# Patient Record
Sex: FEMALE | Race: WHITE | NOT HISPANIC OR LATINO | Employment: OTHER | URBAN - METROPOLITAN AREA
[De-identification: names, ages, dates, MRNs, and addresses within clinical notes are randomized per-mention and may not be internally consistent; named-entity substitution may affect disease eponyms.]

---

## 2017-02-20 DIAGNOSIS — J22 ACUTE LOWER RESPIRATORY INFECTION: ICD-10-CM

## 2017-02-20 DIAGNOSIS — Z87.09 HISTORY OF COPD: ICD-10-CM

## 2017-02-21 RX ORDER — ALBUTEROL SULFATE 90 UG/1
2 AEROSOL, METERED RESPIRATORY (INHALATION) EVERY 6 HOURS PRN
Qty: 8.5 G | Refills: 3 | Status: SHIPPED | OUTPATIENT
Start: 2017-02-21 | End: 2017-11-28

## 2017-02-27 ENCOUNTER — TELEPHONE (OUTPATIENT)
Dept: MEDICAL GROUP | Facility: PHYSICIAN GROUP | Age: 77
End: 2017-02-27

## 2017-02-27 NOTE — TELEPHONE ENCOUNTER
Was the patient seen in the last year in this department? Yes     Does patient have an active prescription for medications requested? No     Received Request Via: Patient     Pt is requsting Ventolin, which I don't see in her chart at all

## 2017-03-11 ENCOUNTER — OFFICE VISIT (OUTPATIENT)
Dept: URGENT CARE | Facility: PHYSICIAN GROUP | Age: 77
End: 2017-03-11
Payer: MEDICARE

## 2017-03-11 VITALS
TEMPERATURE: 98 F | DIASTOLIC BLOOD PRESSURE: 58 MMHG | WEIGHT: 150 LBS | HEART RATE: 60 BPM | OXYGEN SATURATION: 93 % | BODY MASS INDEX: 26.58 KG/M2 | SYSTOLIC BLOOD PRESSURE: 124 MMHG | HEIGHT: 63 IN

## 2017-03-11 DIAGNOSIS — M19.90 OSTEOARTHRITIS, UNSPECIFIED OSTEOARTHRITIS TYPE, UNSPECIFIED SITE: ICD-10-CM

## 2017-03-11 PROCEDURE — 99213 OFFICE O/P EST LOW 20 MIN: CPT | Performed by: NURSE PRACTITIONER

## 2017-03-11 ASSESSMENT — ENCOUNTER SYMPTOMS
JOINT SWELLING: 1
FEVER: 0

## 2017-03-11 NOTE — Clinical Note
March 11, 2017       Patient: Tacho Machado   YOB: 1940   Date of Visit: 3/11/2017         To Whom It May Concern:    It is my medical opinion that Tacho Machado remain out of work until 03/14/17 when she can return to work without restrictions.               Sincerely,          TALHA Garcia  Electronically Signed

## 2017-03-11 NOTE — PROGRESS NOTES
"Subjective:      Tacho Machado is a 76 y.o. female who presents with Knee Pain    Chief Complaint   Patient presents with   • Knee Pain     both knee pain x5days          PFSH reviewed in EPIC electronic record today with patient and are not significant to today's problem         No history of VTE  Medications including OTC medications reviewed with patient.         No Known Allergies      Knee Pain  This is a new problem. Episode onset: 5 days. The problem has been gradually worsening. Associated symptoms include joint swelling. Pertinent negatives include no fever.       Review of Systems   Constitutional: Negative for fever.   Cardiovascular: Negative for leg swelling.   Musculoskeletal: Positive for joint swelling.          Objective:     /58 mmHg  Pulse 60  Temp(Src) 36.7 °C (98 °F)  Ht 1.588 m (5' 2.52\")  Wt 68.04 kg (150 lb)  BMI 26.98 kg/m2  SpO2 93%     Physical Exam   Constitutional: She appears well-developed and well-nourished.   Cardiovascular: Normal rate, regular rhythm and normal pulses.    + 1 lower extremity edema bilaterally.      Musculoskeletal:        Right knee: She exhibits normal range of motion, no swelling, no effusion, no ecchymosis, no deformity, no laceration, no erythema, normal alignment, no LCL laxity, normal patellar mobility, no bony tenderness, normal meniscus and no MCL laxity. Tenderness found. MCL tenderness noted. No medial joint line, no lateral joint line and no patellar tendon tenderness noted.        Legs:  Skin: Skin is warm and intact.   Nursing note and vitals reviewed.              Assessment/Plan:       1. Osteoarthritis, unspecified osteoarthritis type, unspecified site       ACE wrap prn discomfort  Consider cane for support and pain relief  OTC analgesic rubs prn Dosage and directions per   Ice or heat packs prn discomfort  Return to clinic or PCP 3-4 days if current symptoms are not resolving in a satisfactory manner or sooner if new or " worsening symptoms occur.   Patient and or family advised differential diagnoses, signs and symptoms which would warrant Emergency Department evaluation.  Verbalizes understanding of instructions.

## 2017-03-23 ENCOUNTER — PATIENT OUTREACH (OUTPATIENT)
Dept: HEALTH INFORMATION MANAGEMENT | Facility: OTHER | Age: 77
End: 2017-03-23

## 2017-04-04 ENCOUNTER — TELEPHONE (OUTPATIENT)
Dept: HEALTH INFORMATION MANAGEMENT | Facility: OTHER | Age: 77
End: 2017-04-04

## 2017-04-04 NOTE — PROGRESS NOTES
Attempt #:3    Verify PCP: yes    Communication Preference Obtained: yes     Review Care Team: yes    Annual Wellness Visit Scheduling  1. Scheduling Status:Scheduled          Care Gap Scheduling (Attempt to Schedule EACH Overdue Care Gap!)     Health Maintenance Due   Topic Date Due   • Annual Wellness Visit  SCHEDULED   • IMM DTaP/Tdap/Td Vaccine (1 - Tdap) SCHEDULED    • BONE DENSITY  PT SAID SHE NO LONGER GETS         MyChart Activation: sent activation code  ObjectVideot Jaden: no  Virtual Visits: no  Opt In to Text Messages: no

## 2017-04-04 NOTE — TELEPHONE ENCOUNTER
Patient is over the age of 75 and showing overdue for Bone Density. Patient stated she no longer gets this exam.    Please reply to this message as to whether these tests are appropriate and I will update the Health Maintenance Topic or contact the patient to schedule.

## 2017-04-26 ENCOUNTER — TELEPHONE (OUTPATIENT)
Dept: MEDICAL GROUP | Facility: PHYSICIAN GROUP | Age: 77
End: 2017-04-26

## 2017-05-10 NOTE — TELEPHONE ENCOUNTER
ANNUAL WELLNESS VISIT PRE-VISIT PLANNING     1.  Reviewed last PCP office visit assessment and plan notes: Yes    2.  If any orders were placed last visit do we have Results/Consult Notes?        •  Labs? Yes 10/13/2016 COMPLETE       •  Imaging? No        •  Referrals? No     3.  Patient Care Coordination Note was updated with diagnosis information:  No    4.  Patient is due for these Health Maintenance Topics:   Health Maintenance Due   Topic Date Due   • Annual Wellness Visit  1940   • IMM DTaP/Tdap/Td Vaccine (1 - Tdap) 04/24/1959          •  JATIN letter was faxed to:  NONE    5.  Immunizations were updated in [employer] using WebIZ?: No       •  Web Iz Recommendations:  TDAP       •  Is patient due for Tdap/Shingles? Yes.  If yes, was patient alerted of copay? No    6.  Patient has:       •   Diabetes: NO       •   COPD: NO       •   CHF: NO       •   Depression: NO    7.  Updated Care Team with [employer] and all specialists? UNABLE TO REACH       •   Gait devices, O2, CPAP, etc: N\A        •   Eye professional: N\A       •   Other specialists (GYN, cardiology, endo, etc): N\A    8.  Is patient in need of any refills prior to office visit? No       •    Separate refill encounter created?:  N\A    9.  Patient was informed to arrive 15 min prior to their scheduled appointment and bring in their medication bottles? no    10.  Patient was advised: “This is a free wellness visit. The provider will screen for medical conditions to help you stay healthy. If you have other concerns to address you may be asked to discuss these at a separate visit or there may be an additional fee.”  No UNABLE TO REACH

## 2017-05-12 ENCOUNTER — OFFICE VISIT (OUTPATIENT)
Dept: MEDICAL GROUP | Facility: PHYSICIAN GROUP | Age: 77
End: 2017-05-12
Payer: MEDICARE

## 2017-05-12 VITALS
HEIGHT: 63 IN | TEMPERATURE: 97.9 F | OXYGEN SATURATION: 91 % | WEIGHT: 152 LBS | HEART RATE: 65 BPM | DIASTOLIC BLOOD PRESSURE: 68 MMHG | BODY MASS INDEX: 26.93 KG/M2 | SYSTOLIC BLOOD PRESSURE: 154 MMHG

## 2017-05-12 DIAGNOSIS — Z23 NEED FOR DIPHTHERIA-TETANUS-PERTUSSIS (TDAP) VACCINE, ADULT/ADOLESCENT: ICD-10-CM

## 2017-05-12 DIAGNOSIS — M15.9 PRIMARY OSTEOARTHRITIS INVOLVING MULTIPLE JOINTS: ICD-10-CM

## 2017-05-12 DIAGNOSIS — Z00.00 MEDICARE ANNUAL WELLNESS VISIT, SUBSEQUENT: ICD-10-CM

## 2017-05-12 DIAGNOSIS — C14.0 THROAT CANCER (HCC): ICD-10-CM

## 2017-05-12 DIAGNOSIS — F41.9 ANXIETY: ICD-10-CM

## 2017-05-12 PROCEDURE — G8432 DEP SCR NOT DOC, RNG: HCPCS | Performed by: FAMILY MEDICINE

## 2017-05-12 PROCEDURE — 90471 IMMUNIZATION ADMIN: CPT | Performed by: FAMILY MEDICINE

## 2017-05-12 PROCEDURE — 1036F TOBACCO NON-USER: CPT | Performed by: FAMILY MEDICINE

## 2017-05-12 PROCEDURE — G0438 PPPS, INITIAL VISIT: HCPCS | Mod: 25 | Performed by: FAMILY MEDICINE

## 2017-05-12 PROCEDURE — 90715 TDAP VACCINE 7 YRS/> IM: CPT | Performed by: FAMILY MEDICINE

## 2017-05-12 RX ORDER — CITALOPRAM 20 MG/1
20 TABLET ORAL DAILY
Qty: 90 TAB | Refills: 3 | Status: SHIPPED | OUTPATIENT
Start: 2017-05-12 | End: 2018-08-07

## 2017-05-12 ASSESSMENT — PATIENT HEALTH QUESTIONNAIRE - PHQ9: CLINICAL INTERPRETATION OF PHQ2 SCORE: 0

## 2017-05-12 NOTE — MR AVS SNAPSHOT
"Tacho Machado   2017 4:00 PM   Office Visit   MRN: [id number]    Department:  [facility]   Dept Phone:  [phone]    Description:  Female : 1940   Provider:  Ely Decker D.O.; [facility] [place]            Reason for Visit     Annual Exam           Allergies as of 2017     No Known Allergies      You were diagnosed with     Medicare annual wellness visit, subsequent   [726551]       Throat cancer (CMS-HCC)   [637744]       Primary osteoarthritis involving multiple joints   [5449134]       Anxiety   [332876]       Need for diphtheria-tetanus-pertussis (Tdap) vaccine, adult/adolescent   [722403]         Vital Signs     Blood Pressure Pulse Temperature Height Weight Body Mass Index    154/68 mmHg 65 36.6 °C (97.9 °F) 1.588 m (5' 2.5\") 68.947 kg (152 lb) 27.34 kg/m2    Oxygen Saturation Smoking Status                91% Former Smoker          Basic Information     Date Of Birth Sex Race Ethnicity Preferred Language    1940 Female White Non- English      Your appointments     2017  2:20 PM   Established Patient with Ely Decker D.O.   [facility]    [address] [place] [id number] [phone]           You will be receiving a confirmation call a few days before your appointment from our automated call confirmation system.              Problem List              ICD-10-CM Priority Class Noted - Resolved    Anxiety F41.9   10/7/2016 - Present    Throat cancer (CMS-HCC) C14.0   10/7/2016 - Present    Primary osteoarthritis involving multiple joints M15.0   10/7/2016 - Present      Health Maintenance        Date Due Completion Dates    IMM DTaP/Tdap/Td Vaccine (1 - Tdap) 1959 ---    IMM PNEUMOCOCCAL 65+ (ADULT) LOW/MEDIUM RISK SERIES (2 of 2 - PPSV23) 10/7/2017 10/7/2016            Current Immunizations     13-VALENT PCV PREVNAR 10/7/2016    Influenza Vaccine Adult HD 10/7/2016    SHINGLES VACCINE 2015    Tdap Vaccine "  Incomplete      Below and/or attached are the medications your provider expects you to take. Review all of your home medications and newly ordered medications with your provider and/or pharmacist. Follow medication instructions as directed by your provider and/or pharmacist. Please keep your medication list with you and share with your provider. Update the information when medications are discontinued, doses are changed, or new medications (including over-the-counter products) are added; and carry medication information at all times in the event of emergency situations     Allergies:  No Known Allergies          Medications  Valid as of: May 12, 2017 -  4:40 PM    Generic Name Brand Name Tablet Size Instructions for use    Albuterol Sulfate (Aero Soln) albuterol 108 (90 BASE) MCG/ACT Inhale 2 Puffs by mouth every 6 hours as needed for Shortness of Breath.        Benzonatate (Cap) TESSALON 100 MG Take 2 Caps by mouth 3 times a day as needed for Cough.        Citalopram Hydrobromide (Tab) CELEXA 20 MG Take 1 Tab by mouth every day.        Ipratropium-Albuterol (Aero Soln) COMBIVENT RESPIMAT  MCG/ACT Inhale 1 Puff by mouth 4 times a day.        .                 Medicines prescribed today were sent to:     [facility] [address] [place] [address] (S), [place] - [address] [id number] [facility] [place] (S) [place] [address]    Phone: [phone] Fax: [phone]    Open 24 Hours?: No      Medication refill instructions:       If your prescription bottle indicates you have medication refills left, it is not necessary to call your provider’s office. Please contact your pharmacy and they will refill your medication.    If your prescription bottle indicates you do not have any refills left, you may request refills at any time through one of the following ways: The online [employer] system (except Urgent Care), by calling your provider’s office, or by asking your pharmacy to contact your provider’s office with a refill request. Medication  refills are processed only during regular business hours and may not be available until the next business day. Your provider may request additional information or to have a follow-up visit with you prior to refilling your medication.   *Please Note: Medication refills are assigned a new Rx number when refilled electronically. Your pharmacy may indicate that no refills were authorized even though a new prescription for the same medication is available at the pharmacy. Please request the medicine by name with the pharmacy before contacting your provider for a refill.        Your To Do List     Future Labs/Procedures Complete By Expires    COMP METABOLIC PANEL  As directed 5/13/2018    LIPID PROFILE  As directed 5/13/2018    VITAMIN D,25 HYDROXY  As directed 5/13/2018         [employer] Access Code: [email or web address]  Expires: 6/11/2017  4:40 PM    [employer]  A secure, online tool to manage your health information     [employer]® is a secure, online tool that connects you to your personalized health information from the privacy of your home -- day or night - making it very easy for you to manage your healthcare. Once the activation process is completed, you can even access your medical information using the [employer] jaden, which is available for free in the Apple Jaden store or Google Play store.     [employer] provides the following levels of access (as shown below):   My Chart Features   [facility]  Specialists [facility]  Urgent  Care [facility]  Primary Care  Doctor   Email your healthcare team securely and privately 24/7 X X X    Manage appointments: schedule your next appointment; view details of past/upcoming appointments X      Request prescription refills. X      View recent personal medical records, including lab and immunizations X X X X   View health record, including health history, allergies, medications X X X X   Read reports about your outpatient visits, procedures, consult and ER  notes X X X X   See your discharge summary, which is a recap of your hospital and/or ER visit that includes your diagnosis, lab results, and care plan. X X       How to register for [employer]:  1. Go to  [email or web address].  2. Click on the Sign Up Now box, which takes you to the New Member Sign Up page. You will need to provide the following information:  a. Enter your [employer] Access Code exactly as it appears at the top of this page. (You will not need to use this code after you’ve completed the sign-up process. If you do not sign up before the expiration date, you must request a new code.)   b. Enter your date of birth.   c. Enter your home email address.   d. Click Submit, and follow the next screen’s instructions.  3. Create a [employer] ID. This will be your [employer] login ID and cannot be changed, so think of one that is secure and easy to remember.  4. Create a [employer] password. You can change your password at any time.  5. Enter your Password Reset Question and Answer. This can be used at a later time if you forget your password.   6. Enter your e-mail address. This allows you to receive e-mail notifications when new information is available in [employer].  7. Click Sign Up. You can now view your health information.    For assistance activating your [employer] account, call [phone]

## 2017-05-12 NOTE — PROGRESS NOTES
Chief Complaint   Patient presents with   • Annual Exam         HPI:  Tacho Machado is a 77 y.o. female here for Medicare Annual Wellness Visit        Patient Active Problem List    Diagnosis Date Noted   • Anxiety 10/07/2016   • Throat cancer (CMS-HCC) 10/07/2016   • Primary osteoarthritis involving multiple joints 10/07/2016       Current Outpatient Prescriptions   Medication Sig Dispense Refill   • citalopram (CELEXA) 20 MG Tab Take 1 Tab by mouth every day. 90 Tab 3   • albuterol 108 (90 BASE) MCG/ACT Aero Soln inhalation aerosol Inhale 2 Puffs by mouth every 6 hours as needed for Shortness of Breath. 8.5 g 3   • benzonatate (TESSALON) 100 MG Cap Take 2 Caps by mouth 3 times a day as needed for Cough. 30 Cap 0   • ipratropium-albuterol (COMBIVENT RESPIMAT)  MCG/ACT Aero Soln Inhale 1 Puff by mouth 4 times a day.       No current facility-administered medications for this visit.        Patient is taking medications as noted in medication list.  Current supplements as per medication list.   Chronic narcotic pain medicines: no    Allergies: Review of patient's allergies indicates no known allergies.    Current social contact/activities: N/A     Is patient current with immunizations?  Need TDAP    She  reports that she quit smoking about 16 years ago. She has never used smokeless tobacco. She reports that she does not drink alcohol or use illicit drugs.  Counseling given: Not Answered        DPA/Advanced Directive:  Patient does not have an Advanced Directive.  A packet and workshop information was given on Advanced Directives.    ROS:    Gait: Uses no assistive device   Ostomy: no   Other tubes: no   Amputations: no   Chronic oxygen use: no   Last eye exam: 05/01/2016   Wears hearing aids: no   : Denies incontinence.       Screening: mammogram, DEXA, colonoscopy  Depression Screening    Little interest or pleasure in doing things?  0 - not at all  Feeling down, depressed, or hopeless?  0 - not at  all  Patient Health Questionnaire Score: 0  If depressive symptoms identified deferred to follow up visit unless specifically addressed in assessment and plan.    Interpretation of PHQ-9 Total Score   Score Severity   1-4 Minimal Depression   5-9 Mild Depression   10-14 Moderate Depression   15-19 Moderately Severe Depression   20-27 Severe Depression    Screening for Cognitive Impairment    Three Minute Recall (banana, sunrise, fence)  1/3 banana  Draw clock face with all 12 numbers set to the hand to show 10 minutes past 11 o'clock  0    If cognitive concerns identified deferred to follow up visit unless specifically addressed in assessment and plan.    Fall Risk Assessment    Has the patient had two or more falls in the last year or any fall with injury in the last year?  No  If Fall Risk identified deferred to follow up visit unless specifically addressed in assessment and plan.    Safety Assessment    Throw rugs on floor.  Yes  Handrails on all stairs.  No  Good lighting in all hallways.  Yes  Difficulty hearing.  No  Patient counseled about all safety risks that were identified.    Functional Assessment ADLs    Are there any barriers preventing you from cooking for yourself or meeting nutritional needs?  No.    Are there any barriers preventing you from driving safely or obtaining transportation?  No.    Are there any barriers preventing you from using a telephone or calling for help?  No.    Are there any barriers preventing you from shopping?  No.    Are there any barriers preventing you from taking care of your own finances?  No.    Are there any barriers preventing you from managing your medications?  No.    Are currently engaging any exercise or physical activity?  No.       Health Maintenance Summary                Annual Wellness Visit Overdue 1940     IMM DTaP/Tdap/Td Vaccine Overdue 4/24/1959     IMM PNEUMOCOCCAL 65+ (ADULT) LOW/MEDIUM RISK SERIES Next Due 10/7/2017      Done 10/7/2016 Imm Admin:  "Pneumococcal Conjugate Vaccine (Prevnar/PCV-13)          Patient Care Team:  Ely Decker D.O. as PCP - General (Family Medicine)    Social History   Substance Use Topics   • Smoking status: Former Smoker     Quit date: 05/09/2001   • Smokeless tobacco: Never Used   • Alcohol Use: No     Family History   Problem Relation Age of Onset   • Lung Disease Mother      emphysema   • Stroke Father    • Diabetes Father    • Cancer Sister      She  has a past medical history of COPD (chronic obstructive pulmonary disease) (CMS-HCC); Allergy; Anxiety; and Throat cancer (CMS-HCC).   Past Surgical History   Procedure Laterality Date   • Other       voice box   • Abdominal hysterectomy total     • Breast implant revision     • Vocal cord polyp excision       cancer   • Cataract extraction with iol     • Sinus washing             Exam:     Blood pressure 154/68, pulse 65, temperature 36.6 °C (97.9 °F), height 1.588 m (5' 2.5\"), weight 68.947 kg (152 lb), SpO2 91 %. Body mass index is 27.34 kg/(m^2).    Hearing good.    Dentition good  Alert, oriented in no acute distress.  Eye contact is good, speech goal directed, affect calm      Assessment and Plan. The following treatment and monitoring plan is recommended:    1. Medicare annual wellness visit, subsequent  COMP METABOLIC PANEL    LIPID PROFILE    VITAMIN D,25 HYDROXY    Chart updated.   2. Throat cancer (CMS-HCC)      Stable. Monitor   3. Primary osteoarthritis involving multiple joints      Stable. Monitor   4. Anxiety      Stable. Continue current medications; refills provided. Monitor   5. Need for diphtheria-tetanus-pertussis (Tdap) vaccine, adult/adolescent  TDAP VACCINE =>6YO IM    Age appropriate immunization provided; patient tolerated procedure well.         Services suggested: No services needed at this time  Health Care Screening recommendations as per orders if indicated.  Referrals offered: PT/OT/Nutrition counseling/Behavioral Health/Smoking cessation as " per orders if indicated.    Discussion today about general wellness and lifestyle habits:    · Prevent falls and reduce trip hazards; Cautioned about securing or removing rugs.  · Have a working fire alarm and carbon monoxide detector;   · Engage in regular physical activity and social activities       Follow-up: Return in about 6 months (around 11/12/2017) for labs/medication review, Short.

## 2017-09-23 ENCOUNTER — HOSPITAL ENCOUNTER (EMERGENCY)
Facility: MEDICAL CENTER | Age: 77
End: 2017-09-23
Attending: EMERGENCY MEDICINE
Payer: MEDICARE

## 2017-09-23 VITALS
HEART RATE: 64 BPM | SYSTOLIC BLOOD PRESSURE: 158 MMHG | BODY MASS INDEX: 28.2 KG/M2 | OXYGEN SATURATION: 91 % | WEIGHT: 153.22 LBS | DIASTOLIC BLOOD PRESSURE: 73 MMHG | RESPIRATION RATE: 18 BRPM | HEIGHT: 62 IN | TEMPERATURE: 98.1 F

## 2017-09-23 DIAGNOSIS — R51.9 ACUTE NONINTRACTABLE HEADACHE, UNSPECIFIED HEADACHE TYPE: ICD-10-CM

## 2017-09-23 DIAGNOSIS — J02.9 PHARYNGITIS, UNSPECIFIED ETIOLOGY: ICD-10-CM

## 2017-09-23 PROCEDURE — A9270 NON-COVERED ITEM OR SERVICE: HCPCS | Performed by: EMERGENCY MEDICINE

## 2017-09-23 PROCEDURE — 700102 HCHG RX REV CODE 250 W/ 637 OVERRIDE(OP): Performed by: EMERGENCY MEDICINE

## 2017-09-23 PROCEDURE — 99283 EMERGENCY DEPT VISIT LOW MDM: CPT

## 2017-09-23 RX ORDER — ACETAMINOPHEN 325 MG/1
650 TABLET ORAL ONCE
Status: COMPLETED | OUTPATIENT
Start: 2017-09-23 | End: 2017-09-23

## 2017-09-23 RX ADMIN — ACETAMINOPHEN 650 MG: 325 TABLET, FILM COATED ORAL at 21:08

## 2017-09-24 ENCOUNTER — PATIENT OUTREACH (OUTPATIENT)
Dept: HEALTH INFORMATION MANAGEMENT | Facility: OTHER | Age: 77
End: 2017-09-24

## 2017-09-24 NOTE — PROGRESS NOTES
Placed discharge outreach phone call to patient s/p ER discharge 9/23/17.  Left voicemail providing my contact information and instructions to call with any questions or concerns.

## 2017-09-24 NOTE — DISCHARGE INSTRUCTIONS
"Antibiotic Nonuse   Your caregiver felt that the infection or problem was not one that would be helped with an antibiotic.  Infections may be caused by viruses or bacteria. Only a caregiver can tell which one of these is the likely cause of an illness. A cold is the most common cause of infection in both adults and children. A cold is a virus. Antibiotic treatment will have no effect on a viral infection. Viruses can lead to many lost days of work caring for sick children and many missed days of school. Children may catch as many as 10 \"colds\" or \"flus\" per year during which they can be tearful, cranky, and uncomfortable. The goal of treating a virus is aimed at keeping the ill person comfortable.  Antibiotics are medications used to help the body fight bacterial infections. There are relatively few types of bacteria that cause infections but there are hundreds of viruses. While both viruses and bacteria cause infection they are very different types of germs. A viral infection will typically go away by itself within 7 to 10 days. Bacterial infections may spread or get worse without antibiotic treatment.  Examples of bacterial infections are:  · Sore throats (like strep throat or tonsillitis).  · Infection in the lung (pneumonia).  · Ear and skin infections.  Examples of viral infections are:  · Colds or flus.  · Most coughs and bronchitis.  · Sore throats not caused by Strep.  · Runny noses.  It is often best not to take an antibiotic when a viral infection is the cause of the problem. Antibiotics can kill off the helpful bacteria that we have inside our body and allow harmful bacteria to start growing. Antibiotics can cause side effects such as allergies, nausea, and diarrhea without helping to improve the symptoms of the viral infection. Additionally, repeated uses of antibiotics can cause bacteria inside of our body to become resistant. That resistance can be passed onto harmful bacterial. The next time you have " an infection it may be harder to treat if antibiotics are used when they are not needed. Not treating with antibiotics allows our own immune system to develop and take care of infections more efficiently. Also, antibiotics will work better for us when they are prescribed for bacterial infections.  Treatments for a child that is ill may include:  · Give extra fluids throughout the day to stay hydrated.  · Get plenty of rest.  · Only give your child over-the-counter or prescription medicines for pain, discomfort, or fever as directed by your caregiver.  · The use of a cool mist humidifier may help stuffy noses.  · Cold medications if suggested by your caregiver.  Your caregiver may decide to start you on an antibiotic if:  · The problem you were seen for today continues for a longer length of time than expected.  · You develop a secondary bacterial infection.  SEEK MEDICAL CARE IF:  · Fever lasts longer than 5 days.  · Symptoms continue to get worse after 5 to 7 days or become severe.  · Difficulty in breathing develops.  · Signs of dehydration develop (poor drinking, rare urinating, dark colored urine).  · Changes in behavior or worsening tiredness (listlessness or lethargy).  Document Released: 02/26/2003 Document Revised: 03/11/2013 Document Reviewed: 08/25/2010  [employer]® Patient Information ©2014 [employer].      Pharyngitis  Pharyngitis is a sore throat (pharynx). There is redness, pain, and swelling of your throat.  HOME CARE   · Drink enough fluids to keep your pee (urine) clear or pale yellow.  · Only take medicine as told by your doctor.  ¨ You may get sick again if you do not take medicine as told. Finish your medicines, even if you start to feel better.  ¨ Do not take aspirin.  · Rest.  · Rinse your mouth (gargle) with salt water (½ tsp of salt per 1 qt of water) every 1-2 hours. This will help the pain.  · If you are not at risk for choking, you can suck on hard candy or sore throat lozenges.  GET HELP  IF:  · You have large, tender lumps on your neck.  · You have a rash.  · You cough up green, yellow-brown, or bloody spit.  GET HELP RIGHT AWAY IF:   · You have a stiff neck.  · You drool or cannot swallow liquids.  · You throw up (vomit) or are not able to keep medicine or liquids down.  · You have very bad pain that does not go away with medicine.  · You have problems breathing (not from a stuffy nose).  MAKE SURE YOU:   · Understand these instructions.  · Will watch your condition.  · Will get help right away if you are not doing well or get worse.     This information is not intended to replace advice given to you by your health care provider. Make sure you discuss any questions you have with your health care provider.     Document Released: 06/05/2009 Document Revised: 10/08/2014 Document Reviewed: 08/25/2014  [employer] Patient Education ©2016 [employer]

## 2017-09-24 NOTE — ED NOTES
Pt ambulatory to room 61. Pt c/o sore throat, painful swallowing and bilat ear pain x 3 days. Hoarse voice noted/ pt whispering. Hx of throat cancer 10 years ago. Pt swallowing secretions without difficulty.

## 2017-09-24 NOTE — ED PROVIDER NOTES
"ED Provider Note    CHIEF COMPLAINT  Chief Complaint   Patient presents with   • Sore Throat   • Headache       HPI  Tacho Machado is a 77 y.o. female who presents With a chief complaint of sore throat and referred headache from the ears over the last 3 days without fever, cough, nasal congestion. 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 She does have his history of COPD but is not labored in her breathing today and denies any other complaints    ROS  Pertinent negative for fever.    PAST MEDICAL HISTORY  Past Medical History:   Diagnosis Date   • Allergy    • Anxiety    • COPD (chronic obstructive pulmonary disease) (CMS-[facility])    • Throat cancer (CMS-HCC)        FAMILY HISTORY  Family History   Problem Relation Age of Onset   • Lung Disease Mother      emphysema   • Stroke Father    • Diabetes Father    • Cancer Sister        SOCIAL HISTORY   reports that she quit smoking about 16 years ago. She has never used smokeless tobacco. She reports that she does not drink alcohol or use drugs.    SURGICAL HISTORY  Past Surgical History:   Procedure Laterality Date   • ABDOMINAL HYSTERECTOMY TOTAL     • BREAST IMPLANT REVISION     • CATARACT EXTRACTION WITH IOL     • OTHER      voice box   • SINUS WASHING     • VOCAL CORD POLYP EXCISION      cancer       CURRENT MEDICATIONS  Home Medications    **Home medications have not yet been reviewed for this encounter**         ALLERGIES  No Known Allergies    PHYSICAL EXAM  VITAL SIGNS: /89   Pulse 69   Temp 37 °C (98.6 °F) (Temporal)   Resp 16   Ht 1.575 m (5' 2\")   Wt 69.5 kg (153 lb 3.5 oz)   SpO2 91%   BMI 28.02 kg/m²    Constitutional: Well developed, Well nourished, No acute distress, Non-toxic appearance.   HENT: Mild erythema of the throat, no uvular deviation, no pustular exudate  Eyes: Normal  Neck: Minimal tenderness in the left mandibular region  Lymphatic: No lymphadenopathy  Cardiovascular: Regular rate " and rhythm without murmur  Thorax & Lungs: Clear to auscultation without wheezes  Skin: No cyanosis  Neurologic: Neurologically intact  Psychiatric: Normal affect      COURSE & MEDICAL DECISION MAKING  Pertinent Labs & Imaging studies reviewed. (See chart for details)  I have a low suspicion for streptococcal infection and a higher suspicion for viral pharyngitis. I did not obtain any rapid strep test as she has normal vital signs with no tachycardia and no pustular exudate or lymphadenopathy  Patient is encouraged on salt water gargle, lozenges, Tylenol for fever return of any significant change in symptoms discharged in stable condition    FINAL IMPRESSION  1. Pharyngitis, unspecified etiology    2. Acute nonintractable headache, unspecified headache type            Electronically signed by: Jose C Phillips, 9/23/2017 8:50 PM    The note accurately reflects work and decisions made by me.  Jose C Phillips  9/23/2017  8:53 PM

## 2017-09-24 NOTE — ED NOTES
Pt to triage c/o sore throat , c/o headache , c/o pain w/ swallowing and ear pain , denies SOB, denies chest pain

## 2017-10-02 ENCOUNTER — HOSPITAL ENCOUNTER (OUTPATIENT)
Dept: LAB | Facility: MEDICAL CENTER | Age: 77
End: 2017-10-02
Attending: OTOLARYNGOLOGY
Payer: MEDICARE

## 2017-10-02 LAB
BUN SERPL-MCNC: 16 MG/DL (ref 8–22)
CREAT SERPL-MCNC: 0.6 MG/DL (ref 0.5–1.4)
GFR SERPL CREATININE-BSD FRML MDRD: >60 ML/MIN/1.73 M 2

## 2017-10-02 PROCEDURE — 36415 COLL VENOUS BLD VENIPUNCTURE: CPT

## 2017-10-02 PROCEDURE — 82565 ASSAY OF CREATININE: CPT

## 2017-10-02 PROCEDURE — 84520 ASSAY OF UREA NITROGEN: CPT

## 2017-10-03 ENCOUNTER — HOSPITAL ENCOUNTER (OUTPATIENT)
Dept: RADIOLOGY | Facility: MEDICAL CENTER | Age: 77
End: 2017-10-03
Attending: OTOLARYNGOLOGY
Payer: MEDICARE

## 2017-10-03 DIAGNOSIS — D49.1 NEOPLASM OF RESPIRATORY SYSTEM: ICD-10-CM

## 2017-10-03 PROCEDURE — 700117 HCHG RX CONTRAST REV CODE 255: Performed by: OTOLARYNGOLOGY

## 2017-10-03 PROCEDURE — 70491 CT SOFT TISSUE NECK W/DYE: CPT

## 2017-10-03 RX ADMIN — IOHEXOL 100 ML: 350 INJECTION, SOLUTION INTRAVENOUS at 16:45

## 2017-11-28 ENCOUNTER — OFFICE VISIT (OUTPATIENT)
Dept: MEDICAL GROUP | Facility: PHYSICIAN GROUP | Age: 77
End: 2017-11-28
Payer: MEDICARE

## 2017-11-28 VITALS
HEIGHT: 63 IN | OXYGEN SATURATION: 91 % | HEART RATE: 90 BPM | BODY MASS INDEX: 28.7 KG/M2 | SYSTOLIC BLOOD PRESSURE: 138 MMHG | TEMPERATURE: 97.6 F | DIASTOLIC BLOOD PRESSURE: 82 MMHG | WEIGHT: 162 LBS

## 2017-11-28 DIAGNOSIS — M25.562 CHRONIC PAIN OF LEFT KNEE: ICD-10-CM

## 2017-11-28 DIAGNOSIS — F41.9 ANXIETY: ICD-10-CM

## 2017-11-28 DIAGNOSIS — Z23 NEED FOR VACCINATION: ICD-10-CM

## 2017-11-28 DIAGNOSIS — C14.0 THROAT CANCER (HCC): ICD-10-CM

## 2017-11-28 DIAGNOSIS — G89.29 CHRONIC PAIN OF LEFT KNEE: ICD-10-CM

## 2017-11-28 PROCEDURE — 90732 PPSV23 VACC 2 YRS+ SUBQ/IM: CPT | Performed by: FAMILY MEDICINE

## 2017-11-28 PROCEDURE — 99214 OFFICE O/P EST MOD 30 MIN: CPT | Mod: 25 | Performed by: FAMILY MEDICINE

## 2017-11-28 PROCEDURE — G0009 ADMIN PNEUMOCOCCAL VACCINE: HCPCS | Performed by: FAMILY MEDICINE

## 2017-11-28 NOTE — PROGRESS NOTES
"Subjective:   Tacho Machado is a 77 y.o. female here today for Anxiety, knee pain, throat cancer    Throat cancer (CMS-HCC)  Ongoing issue. Patient reports that she still follows up with oncology for annual scans. Her most recent scan was negative for any cancer but did show scar tissue.    Chronic pain of left knee  Ongoing issue. Patient reports he continues to have pain off and on her left knee. She reports the pain is achy to sharp in nature; comes and goes; no swelling or erythema; she doesn't take anything for the pain. She reports that movement makes it worse and rest makes it better    Anxiety  Ongoing issue. Patient reports compliance with Celexa 20 mg daily; she denies any issues with weakness or sleepiness. She reports that the medication does stabilize her mood.         Current medicines (including changes today)  Current Outpatient Prescriptions   Medication Sig Dispense Refill   • citalopram (CELEXA) 20 MG Tab Take 1 Tab by mouth every day. 90 Tab 3     No current facility-administered medications for this visit.      She  has a past medical history of Allergy; Anxiety; COPD (chronic obstructive pulmonary disease) (CMS-[facility]); and Throat cancer (CMS-[facility]).    ROS   No chest pain, no shortness of breath, no abdominal pain  +Left knee pain     Objective:     Blood pressure 138/82, pulse 90, temperature 36.4 °C (97.6 °F), height 1.588 m (5' 2.5\"), weight 73.5 kg (162 lb), SpO2 91 %. Body mass index is 29.16 kg/m².   Physical Exam:  Alert, oriented in no acute distress.  Eye contact is good, speech goal directed, affect calm  HEENT: conjunctiva non-injected, sclera non-icteric.  Pinna normal. TM pearly gray.   Oral mucous membranes pink and moist with no lesions.  Neck No adenopathy or masses in the neck or supraclavicular regions.  Lungs: clear to auscultation bilaterally with good excursion.  CV: regular rate and rhythm.  Abdomen: soft, nontender, No CVAT  Ext: no edema, color normal, vascularity normal, " temperature normal  MSK: Left knee-mild crepitus on flexion-extension; no swelling; no erythema      Assessment and Plan:   The following treatment plan was discussed     1. Chronic pain of left knee      Stable. Monitor   2. Anxiety      Stable. Continue current medication; monitor   3. Throat cancer (CMS-HCC)      Stable. Continue follow-up with specialist; monitor   4. Need for vaccination  PNEUMOCOCCAL POLYSACCHARIDE VACCINE 23-VALENT =>3YO SQ/IM    Age appropriate immunization provided; patient tolerated procedure well.       Followup: Return in about 6 months (around 5/28/2018) for medication review, Short.

## 2017-11-28 NOTE — ASSESSMENT & PLAN NOTE
Ongoing issue. Patient reports he continues to have pain off and on her left knee. She reports the pain is achy to sharp in nature; comes and goes; no swelling or erythema; she doesn't take anything for the pain. She reports that movement makes it worse and rest makes it better

## 2017-11-28 NOTE — ASSESSMENT & PLAN NOTE
Ongoing issue. Patient reports compliance with Celexa 20 mg daily; she denies any issues with weakness or sleepiness. She reports that the medication does stabilize her mood.

## 2017-11-28 NOTE — ASSESSMENT & PLAN NOTE
Ongoing issue. Patient reports that she still follows up with oncology for annual scans. Her most recent scan was negative for any cancer but did show scar tissue.

## 2017-12-04 ENCOUNTER — HOSPITAL ENCOUNTER (OUTPATIENT)
Dept: LAB | Facility: MEDICAL CENTER | Age: 77
End: 2017-12-04
Attending: FAMILY MEDICINE
Payer: MEDICARE

## 2017-12-04 DIAGNOSIS — Z00.00 MEDICARE ANNUAL WELLNESS VISIT, SUBSEQUENT: ICD-10-CM

## 2017-12-04 LAB
25(OH)D3 SERPL-MCNC: 20 NG/ML (ref 30–100)
ALBUMIN SERPL BCP-MCNC: 4 G/DL (ref 3.2–4.9)
ALBUMIN/GLOB SERPL: 1.4 G/DL
ALP SERPL-CCNC: 65 U/L (ref 30–99)
ALT SERPL-CCNC: 9 U/L (ref 2–50)
ANION GAP SERPL CALC-SCNC: 8 MMOL/L (ref 0–11.9)
AST SERPL-CCNC: 17 U/L (ref 12–45)
BILIRUB SERPL-MCNC: 1.1 MG/DL (ref 0.1–1.5)
BUN SERPL-MCNC: 16 MG/DL (ref 8–22)
CALCIUM SERPL-MCNC: 9.3 MG/DL (ref 8.5–10.5)
CHLORIDE SERPL-SCNC: 103 MMOL/L (ref 96–112)
CHOLEST SERPL-MCNC: 199 MG/DL (ref 100–199)
CO2 SERPL-SCNC: 28 MMOL/L (ref 20–33)
CREAT SERPL-MCNC: 0.7 MG/DL (ref 0.5–1.4)
GFR SERPL CREATININE-BSD FRML MDRD: >60 ML/MIN/1.73 M 2
GLOBULIN SER CALC-MCNC: 2.9 G/DL (ref 1.9–3.5)
GLUCOSE SERPL-MCNC: 90 MG/DL (ref 65–99)
HDLC SERPL-MCNC: 56 MG/DL
LDLC SERPL CALC-MCNC: 120 MG/DL
POTASSIUM SERPL-SCNC: 4 MMOL/L (ref 3.6–5.5)
PROT SERPL-MCNC: 6.9 G/DL (ref 6–8.2)
SODIUM SERPL-SCNC: 139 MMOL/L (ref 135–145)
TRIGL SERPL-MCNC: 114 MG/DL (ref 0–149)

## 2017-12-04 PROCEDURE — 82306 VITAMIN D 25 HYDROXY: CPT

## 2017-12-04 PROCEDURE — 36415 COLL VENOUS BLD VENIPUNCTURE: CPT

## 2017-12-04 PROCEDURE — 80061 LIPID PANEL: CPT

## 2017-12-04 PROCEDURE — 80053 COMPREHEN METABOLIC PANEL: CPT

## 2017-12-05 ENCOUNTER — TELEPHONE (OUTPATIENT)
Dept: MEDICAL GROUP | Facility: PHYSICIAN GROUP | Age: 77
End: 2017-12-05

## 2017-12-05 NOTE — TELEPHONE ENCOUNTER
----- Message from Ely Decker D.O. sent at 12/5/2017  8:09 AM PST -----  Please advise pt all labs are in a normal/acceptable range except: Vitamin D.  Recommend please take over-the-counter vitamin D 2000 international units twice daily.    Ely Decker D.O.

## 2018-03-13 RX ORDER — CITALOPRAM 40 MG/1
TABLET ORAL
Qty: 90 TAB | Refills: 2 | Status: SHIPPED | OUTPATIENT
Start: 2018-03-13

## 2018-05-29 ENCOUNTER — APPOINTMENT (OUTPATIENT)
Dept: MEDICAL GROUP | Facility: PHYSICIAN GROUP | Age: 78
End: 2018-05-29
Payer: MEDICARE

## 2018-08-07 ENCOUNTER — OFFICE VISIT (OUTPATIENT)
Dept: MEDICAL GROUP | Facility: PHYSICIAN GROUP | Age: 78
End: 2018-08-07
Payer: MEDICARE

## 2018-08-07 VITALS
DIASTOLIC BLOOD PRESSURE: 62 MMHG | BODY MASS INDEX: 26.58 KG/M2 | WEIGHT: 150 LBS | TEMPERATURE: 97.4 F | SYSTOLIC BLOOD PRESSURE: 148 MMHG | HEART RATE: 88 BPM | HEIGHT: 63 IN | OXYGEN SATURATION: 92 %

## 2018-08-07 DIAGNOSIS — G89.29 CHRONIC PAIN OF LEFT KNEE: ICD-10-CM

## 2018-08-07 DIAGNOSIS — Z00.00 WELL ADULT HEALTH CHECK: ICD-10-CM

## 2018-08-07 DIAGNOSIS — M25.562 CHRONIC PAIN OF LEFT KNEE: ICD-10-CM

## 2018-08-07 PROCEDURE — 99214 OFFICE O/P EST MOD 30 MIN: CPT | Performed by: FAMILY MEDICINE

## 2018-08-07 NOTE — ASSESSMENT & PLAN NOTE
Ongoing issue.  Patient is followed up with her orthopedic surgeon and has been told that she will need to have knee replacement surgery at this time.  Patient reports that the pain has become so severe that is difficult for her to work even 3 days a week.  She states the pain is sharp when she is standing for long periods of time; otherwise achy.  She does have associated swelling but no numbness or tingling.  She currently takes over-the-counter ibuprofen which is mildly beneficial.

## 2018-08-07 NOTE — ASSESSMENT & PLAN NOTE
Patient is here today for routine evaluation and also presurgical clearance for knee replacement.  Patient currently denies any issues or concerns that need to be addressed besides the knee pain.  Review of chart shows that her only medication is Celexa 40 mg for her anxiety.  Blood pressure is mildly elevated today the patient states that she is in severe pain with her left knee.  She currently denies any trouble breathing.

## 2018-08-07 NOTE — PROGRESS NOTES
"Subjective:   Tacho Machado is a 78 y.o. female here today for knee pain, presurgical clearance    Chronic pain of left knee  Ongoing issue.  Patient is followed up with her orthopedic surgeon and has been told that she will need to have knee replacement surgery at this time.  Patient reports that the pain has become so severe that is difficult for her to work even 3 days a week.  She states the pain is sharp when she is standing for long periods of time; otherwise achy.  She does have associated swelling but no numbness or tingling.  She currently takes over-the-counter ibuprofen which is mildly beneficial.    Well adult health check  Patient is here today for routine evaluation and also presurgical clearance for knee replacement.  Patient currently denies any issues or concerns that need to be addressed besides the knee pain.  Review of chart shows that her only medication is Celexa 40 mg for her anxiety.  Blood pressure is mildly elevated today the patient states that she is in severe pain with her left knee.  She currently denies any trouble breathing.         Current medicines (including changes today)  Current Outpatient Prescriptions   Medication Sig Dispense Refill   • citalopram (CELEXA) 40 MG Tab TAKE ONE-HALF TABLET BY MOUTH EVERY 12 HOURS 90 Tab 2     No current facility-administered medications for this visit.      She  has a past medical history of Allergy; Anxiety; COPD (chronic obstructive pulmonary disease) ([facility]); and Throat cancer ([facility]).    ROS   No chest pain, no shortness of breath, no abdominal pain  +left knee pain and swelling     Objective:     Blood pressure 148/62, pulse 88, temperature 36.3 °C (97.4 °F), height 1.588 m (5' 2.5\"), weight 68 kg (150 lb), SpO2 92 %. Body mass index is 27 kg/m².   Physical Exam:  Alert, oriented in no acute distress.  Eye contact is good, speech goal directed, affect calm  HEENT: conjunctiva non-injected, sclera non-icteric.  Pinna normal. TM pearly gray. "   Oral mucous membranes pink and moist with no lesions.  Neck No adenopathy or masses in the neck or supraclavicular regions.  Lungs: clear to auscultation bilaterally with good excursion.  CV: regular rate and rhythm. Mild systolic ejection murmur noted (unchanged)  Abdomen: soft, nontender, No CVAT  Ext: no edema, color normal, vascularity normal, temperature normal  MSK: left knee - trace swelling noted, no erythema; decrease AROM due to pain; crepitus on flexion/extension      Assessment and Plan:   The following treatment plan was discussed     1. Well adult health check  COMP METABOLIC PANEL    No acute findings on exam aside from the knee issues.  Labs ordered for evaluation   2. Chronic pain of left knee      Uncontrolled; patient will schedule knee replacement surgery       Followup: Return if symptoms worsen or fail to improve.

## 2018-08-28 ENCOUNTER — TELEPHONE (OUTPATIENT)
Dept: MEDICAL GROUP | Facility: PHYSICIAN GROUP | Age: 78
End: 2018-08-28

## 2018-08-28 NOTE — TELEPHONE ENCOUNTER
I do not see a preop evaluation in patient's chart.  She will likely need to come in for a visit to discuss this.  Another option is to wait for Dr. Decker to return to see if she is willing to write a letter for the patient.  -Dr. Sanchez covering for Dr. Decker

## 2018-08-28 NOTE — TELEPHONE ENCOUNTER
1. Caller Name: Tacho Machado                                         Call Back Number: [phone] (home)       Patient approves a detailed voicemail message: N\A    Pt was seen on 08/07/18 a surgical clearance for knee surgery.   Pt stated that THANIA Dr. Jeff Jack has not received a letter clearing her for surgery.  Please fax a clearance letter, Thank you

## 2018-09-07 ENCOUNTER — TELEPHONE (OUTPATIENT)
Dept: MEDICAL GROUP | Facility: PHYSICIAN GROUP | Age: 78
End: 2018-09-07

## 2018-09-07 ENCOUNTER — HOSPITAL ENCOUNTER (OUTPATIENT)
Facility: MEDICAL CENTER | Age: 78
End: 2018-09-07
Attending: ORTHOPAEDIC SURGERY | Admitting: ORTHOPAEDIC SURGERY
Payer: MEDICARE

## 2018-10-12 ENCOUNTER — PATIENT OUTREACH (OUTPATIENT)
Dept: HEALTH INFORMATION MANAGEMENT | Facility: OTHER | Age: 78
End: 2018-10-12

## 2018-10-12 NOTE — PROGRESS NOTES
Outcome: No answer / No voicemail     Please transfer to Patient Outreach Team at [phone] when patient returns call.    WebIZ Checked & Epic Updated:  yes  Td (adult), adsorbed   Influenza w/preserv.   Zoster Tyson (Shingrix)     [facility] Verified: yes  Effective Date: 1/1/2018     Attempt # 1

## 2018-10-30 NOTE — PROGRESS NOTES
Outcome: No answer / No voice mail    Please transfer to Patient Outreach Team at [phone] when patient returns call.    Attempt # 2

## 2018-11-07 NOTE — PROGRESS NOTES
Outcome: No answer / No voicemail     Please transfer to Patient Outreach Team at [phone] when patient returns call.    Attempt # 3

## 2018-11-10 NOTE — PROGRESS NOTES
Outcome: Left Message    Please transfer to Patient Outreach Team at [phone] when patient returns call.    Attempt # 4

## 2021-01-14 DIAGNOSIS — Z23 NEED FOR VACCINATION: ICD-10-CM
